# Patient Record
Sex: MALE | Race: OTHER | NOT HISPANIC OR LATINO
[De-identification: names, ages, dates, MRNs, and addresses within clinical notes are randomized per-mention and may not be internally consistent; named-entity substitution may affect disease eponyms.]

---

## 2021-04-26 ENCOUNTER — APPOINTMENT (OUTPATIENT)
Dept: UROLOGY | Facility: CLINIC | Age: 71
End: 2021-04-26
Payer: MEDICARE

## 2021-04-26 VITALS
DIASTOLIC BLOOD PRESSURE: 65 MMHG | HEIGHT: 69 IN | BODY MASS INDEX: 28.88 KG/M2 | TEMPERATURE: 97.5 F | WEIGHT: 195 LBS | HEART RATE: 76 BPM | SYSTOLIC BLOOD PRESSURE: 106 MMHG

## 2021-04-26 DIAGNOSIS — R94.8 ABNORMAL RESULTS OF FUNCTION STUDIES OF OTHER ORGANS AND SYSTEMS: ICD-10-CM

## 2021-04-26 DIAGNOSIS — C79.51 MALIGNANT NEOPLASM OF PROSTATE: ICD-10-CM

## 2021-04-26 DIAGNOSIS — C61 MALIGNANT NEOPLASM OF PROSTATE: ICD-10-CM

## 2021-04-26 DIAGNOSIS — R97.21 RISING PSA FOLLOWING TREATMENT FOR MALIGNANT NEOPLASM OF PROSTATE: ICD-10-CM

## 2021-04-26 DIAGNOSIS — N39.41 URGE INCONTINENCE: ICD-10-CM

## 2021-04-26 PROCEDURE — 99204 OFFICE O/P NEW MOD 45 MIN: CPT

## 2021-04-26 PROCEDURE — 99072 ADDL SUPL MATRL&STAF TM PHE: CPT

## 2021-04-26 RX ORDER — LEUPROLIDE ACETATE 30 MG
30 KIT INTRAMUSCULAR
Refills: 0 | Status: ACTIVE | COMMUNITY

## 2021-04-26 NOTE — LETTER BODY
[FreeTextEntry1] : Karlo Chin MD\par [address], [address]\[place] [address]\par P: [phone]\par F: [phone]\par \par Dear Dr. Chin,\par \par Reason for Visit: Urinary incontinence.\par \par This is a 70 year-old [place]er speaking gentleman with metastatic prostate cancer, presenting with urinary incontinence. He is accompanied by is son who translates for [employer]. Patient is referred for evaluation of his condition. He was first diagnosed with prostate cancer 3 years ago by Dr. Apollo Bradshaw. His preop PSA was 19 by his report. Patient subsequently underwent prostate surgery and salvage radiation therapy. By report he had positive lymph node involvement. However, he now has a rising PSA. His current PSA is 9 despite chemotherapy and LHRH agonist. He recently underwent a bone scan, which demonstrated evidence of metastatic disease to the bone. His lymph nodes were also positive for metastases. The patient is currently undergoing hormone therapy with Dr. Sean Jain. He complains of urinary incontinence. Patient denies any gross hematuria or dysuria. The patient denies any aggravating or relieving factors. The patient denies any interference of function. The patient is entirely asymptomatic. All other review of systems are negative. He has no cancer in his family medical history. He has previous surgical history. Past medical history, family history and social history were inquired and were noncontributory to current condition. The patient does not use tobacco or drink alcohol. Medications and allergies were reviewed. He has no known allergies to medication. \par \par On examination, the patient is a healthy-appearing gentleman in no acute distress. He is alert and oriented follows commands. He has normal mood and affect. He is normocephalic. Oral no thrush. Neck is supple. Respirations are unlabored. His abdomen is soft and nontender. Liver is nonpalpable. Bladder is nonpalpable. No CVA tenderness. Neurologically he is grossly intact. No peripheral edema. Skin without gross abnormality. He has normal male external genitalia. Normal meatus. Bilateral testes are descended intrascrotally and normal to palpation. \par \par Assessment: Urinary incontinence. Castrate resistant prostate cancer\par \par I counseled the patient. I discussed the various etiologies of his symptoms. I recommended the patient consult with Dr. Keita to evaluate for AUS. In terms of his prostate cancer, I encouraged him to followup with Dr. Jain his medical oncologist.  Risks and alternatives were discussed. I answered the patient questions. The patient will follow-up as directed and will contact me with any questions or concerns. Thank you for the opportunity to participate in the care of Mr. BONE. I will keep you updated on his progress.\par \par Plan: See Dr. Keita. Followup with medical oncology.

## 2021-04-26 NOTE — ADDENDUM
[FreeTextEntry1] : Entered by Clay Marques, acting as scribe for Dr. Collins Phillips.\par \par The documentation recorded by the scribe accurately reflects the service I personally performed and the decisions made by me.\par